# Patient Record
Sex: FEMALE | Race: OTHER | URBAN - METROPOLITAN AREA
[De-identification: names, ages, dates, MRNs, and addresses within clinical notes are randomized per-mention and may not be internally consistent; named-entity substitution may affect disease eponyms.]

---

## 2019-07-20 ENCOUNTER — IMPORTED ENCOUNTER (OUTPATIENT)
Dept: URBAN - METROPOLITAN AREA CLINIC 1 | Facility: CLINIC | Age: 74
End: 2019-07-20

## 2019-07-20 PROBLEM — H04.123: Noted: 2019-07-20

## 2019-07-20 PROBLEM — H35.3131: Noted: 2019-07-20

## 2019-07-20 PROBLEM — H25.813: Noted: 2019-07-20

## 2019-07-20 PROBLEM — H16.143: Noted: 2019-07-20

## 2019-07-20 PROCEDURE — 92015 DETERMINE REFRACTIVE STATE: CPT

## 2019-07-20 PROCEDURE — 92004 COMPRE OPH EXAM NEW PT 1/>: CPT

## 2019-07-20 NOTE — PATIENT DISCUSSION
1.  Cataract OU: Observe for now without intervention. The patient was advised to contact us if any change or worsening of vision2. ARMD OU Early/dry/stable. Importance of daily AREDS II study multivitamin and Amsler Grid checks discussed with patient. Patient to follow-up immediately with any new onset of decreased vision and/or metamorphopsia. 3. CANDY w/ PEK OU- Recommend ATs TID OU routinely 4. H/o Ocular Migraine  MRX for glasses given. Return for an appointment in 6 months 10/dfe with Dr. Malika Oropeza.

## 2020-01-30 ENCOUNTER — IMPORTED ENCOUNTER (OUTPATIENT)
Dept: URBAN - METROPOLITAN AREA CLINIC 1 | Facility: CLINIC | Age: 75
End: 2020-01-30

## 2020-01-30 PROBLEM — H35.3131: Noted: 2020-01-30

## 2020-01-30 PROBLEM — H25.813: Noted: 2020-01-30

## 2020-01-30 PROCEDURE — 92012 INTRM OPH EXAM EST PATIENT: CPT

## 2020-05-07 ENCOUNTER — IMPORTED ENCOUNTER (OUTPATIENT)
Dept: URBAN - METROPOLITAN AREA CLINIC 1 | Facility: CLINIC | Age: 75
End: 2020-05-07

## 2020-05-07 PROBLEM — H00.15: Noted: 2020-05-07

## 2020-05-07 PROBLEM — H00.031: Noted: 2020-05-07

## 2020-05-07 PROBLEM — H00.11: Noted: 2020-05-07

## 2020-05-07 PROCEDURE — 92012 INTRM OPH EXAM EST PATIENT: CPT

## 2020-05-07 NOTE — PATIENT DISCUSSION
1.  Chalazion Abscessed RUL-- Patient was compliant with hot compresses without resolution and is currently on Keflex. Discussed option for Incision and Drainage. Risks and Benefits discussed with patient. Patient stated complete understanding and would like to proceed with procedure. 2. Chalazion left lower eyelid -- Hot compresses TID x 5 minutes for 3 weeks. If without improvement discussed with patient possible Incision and Drainage procedure. Risks and benefits discussed with patient and patient states full understanding. 3. Cataract OU -- Observe for now without intervention. The patient was advised to contact us if any change or worsening of vision4. CANDY w/ PEK OU -- Cont PF ATs TID OU Routinely (Sample of PF Refresh [place] 3 given). Consider Plugs w/o improvement. 5.  H/o ARMD OU Early/dry/stable. Return for an appointment tomorrow for an I&D RUL with Dr. Nancy Gonzalez.

## 2020-05-07 NOTE — PATIENT DISCUSSION
Chalazion abscessed right upper eyelid - Hot compresses TID x 5 minutes for 3 weeks. If without improvement discussed with patient possible Incision and Drainage procedure. Risks and benefits discussed with patient and patient states full understanding.

## 2020-05-08 ENCOUNTER — IMPORTED ENCOUNTER (OUTPATIENT)
Dept: URBAN - METROPOLITAN AREA CLINIC 1 | Facility: CLINIC | Age: 75
End: 2020-05-08

## 2020-05-08 PROCEDURE — 67700 BLEPHAROTOMY DRG ABSC EYELID: CPT

## 2020-05-08 NOTE — PATIENT DISCUSSION
Incision and drainage of abscessed chalazion on right upper eyelid: After proper The patient was taken to the operating room and placed supine on the operating table. The right upper eye lid was prepped and draped in the standard surgical fashion. Xylocaine 1% and Epinephrine was infiltrated around the chalaziion. A chalazion speculum was then placed and the eyelid was everted. A cross incision was made through the chalazion and immediate release of the lipogranulomatous material was expressed. A curette was used to further evacuate the chalazion cavity. The speculum was removed Erythromycin ointment was applied and a pressure patch was placed. The patient tolerated the procedure well and there were no complications. Return for an appointment in August 30 with Dr. Dalila Miramontes.

## 2020-08-11 ENCOUNTER — IMPORTED ENCOUNTER (OUTPATIENT)
Dept: URBAN - METROPOLITAN AREA CLINIC 1 | Facility: CLINIC | Age: 75
End: 2020-08-11

## 2020-08-11 PROBLEM — H43.813: Noted: 2020-08-11

## 2020-08-11 PROBLEM — H25.813: Noted: 2020-08-11

## 2020-08-11 PROBLEM — H35.3132: Noted: 2020-08-11

## 2020-08-11 PROBLEM — H00.15: Noted: 2020-08-11

## 2020-08-11 PROCEDURE — 92014 COMPRE OPH EXAM EST PT 1/>: CPT

## 2020-08-11 NOTE — PATIENT DISCUSSION
1.  ARMD OU intermediate/dry/progression noted OU today. Importance of daily AREDS II study multivitamin and [place]ms[place] Grid checks discussed with patient. Patient to follow-up immediately with any new onset of decreased vision and/or metamorphopsia. 2. Residual Chalazion left lower eyelid -- Restart Hot compresses TID x 5 minutes. Recommend Therapearl mask. 3.  Cataract OU -- Observe for now without intervention. The patient was advised to contact us if any change or worsening of vision4. CANDY w/ PEK OU -- Cont PF ATs TID OU routinely. Plugs w/o improvement. 5.  PVD OU -- RD precautions. Patient defers MRx today. Return for an appointment in 6 months for a 10/dfe/glare with Dr. Karen Monreal.

## 2021-04-15 ENCOUNTER — IMPORTED ENCOUNTER (OUTPATIENT)
Dept: URBAN - METROPOLITAN AREA CLINIC 1 | Facility: CLINIC | Age: 76
End: 2021-04-15

## 2021-04-15 PROBLEM — H04.123: Noted: 2021-04-15

## 2021-04-15 PROBLEM — H35.3132: Noted: 2021-04-15

## 2021-04-15 PROBLEM — H25.813: Noted: 2021-04-15

## 2021-04-15 PROBLEM — H16.143: Noted: 2021-04-15

## 2021-04-15 PROCEDURE — 92012 INTRM OPH EXAM EST PATIENT: CPT

## 2021-04-15 NOTE — PATIENT DISCUSSION
1.  ARMD OU intermediate/dry/stable. Importance of daily AREDS II study multivitamin and Amsler Grid checks discussed with patient. Patient to follow-up immediately with any new onset of decreased vision and/or metamorphopsia. 2. Cataract OU -- Observe for now without intervention. The patient was advised to contact us if any change or worsening of vision3. CANDY w/ PEK OU -- Cont PF ATs TID OU routinely. Plugs w/o improvement. 4.  PVD OU -- RD precautions. Return for an appointment in 6 months for a 30/Mac photo/glare with Dr. Claudeen Cobble.

## 2021-11-08 ENCOUNTER — IMPORTED ENCOUNTER (OUTPATIENT)
Dept: URBAN - METROPOLITAN AREA CLINIC 1 | Facility: CLINIC | Age: 76
End: 2021-11-08

## 2021-11-08 PROBLEM — H25.813: Noted: 2021-11-08

## 2021-11-08 PROBLEM — H35.3132: Noted: 2021-11-08

## 2021-11-08 PROCEDURE — 92250 FUNDUS PHOTOGRAPHY W/I&R: CPT

## 2021-11-08 PROCEDURE — 92015 DETERMINE REFRACTIVE STATE: CPT

## 2021-11-08 PROCEDURE — 99214 OFFICE O/P EST MOD 30 MIN: CPT

## 2021-11-08 NOTE — PATIENT DISCUSSION
1.  ARMD OU Intermediate/dry/stable. Importance of daily AREDS II study multivitamin and Amsler Grid checks discussed with patient. Patient to follow-up immediately with any new onset of decreased vision and/or metamorphopsia. 2. Cataract OU:  Visually Significant secondary to glare discussed the risks benefits alternatives and limitations of cataract surgery. The patient stated a full understanding and a desire to proceed with the procedure. The patient will need to return for preop appointment with cataract measurements and to have any additional questions answered and start pre-operative eye drops as directed. *Myopic Goal approximately -2.50*Phaco PCL OS then OD Otherwise follow-up 6 months DFE/glare 3. CANDY w/ PEK OU - Recommend PF ATs TID OU routinely. Plugs w/o improvement. 4.  PVD OU - RD precautions. Return for an appointment for Mervin/JEREMIAS and PJami with Dr. Nancy Gonzalez.

## 2022-01-07 ENCOUNTER — IMPORTED ENCOUNTER (OUTPATIENT)
Dept: URBAN - METROPOLITAN AREA CLINIC 1 | Facility: CLINIC | Age: 77
End: 2022-01-07

## 2022-01-07 PROBLEM — H25.813: Noted: 2022-01-07

## 2022-01-07 PROCEDURE — 92136 OPHTHALMIC BIOMETRY: CPT

## 2022-01-07 NOTE — PATIENT DISCUSSION
1.  Cataract OU - Visually Significant secondary to glare discussed the risks benefits alternatives and limitations of cataract surgery. The patient stated a full understanding and a desire to proceed with the procedure. The patient will need to start pre-operative eye drops as directed. *Myopic Goal approximately -2.50*Phaco PCL OS then OD 2. H/o ARMD OU Intermediate/dry/stable. 3.  H/o CANDY w/ PEK OU 4.   H/o PVD OU

## 2022-01-12 ENCOUNTER — IMPORTED ENCOUNTER (OUTPATIENT)
Dept: URBAN - METROPOLITAN AREA CLINIC 1 | Facility: CLINIC | Age: 77
End: 2022-01-12

## 2022-01-12 PROBLEM — Z96.1: Noted: 2022-01-12

## 2022-01-13 ENCOUNTER — IMPORTED ENCOUNTER (OUTPATIENT)
Dept: URBAN - METROPOLITAN AREA CLINIC 1 | Facility: CLINIC | Age: 77
End: 2022-01-13

## 2022-01-13 PROBLEM — Z96.1: Noted: 2022-01-13

## 2022-01-13 PROCEDURE — 99024 POSTOP FOLLOW-UP VISIT: CPT

## 2022-01-13 NOTE — PATIENT DISCUSSION
POD#1 CE/IOL OS (Standard w/ LenSx) doing well. *Myopic Target*Use Prednisolone BID OS Prolensa Qdaily OS Ocuflox TID OS: Use all three gtts through completion of PO gtt chart regimen/ Per our instructions given to patient.   Post op Warnings Reiterated RTC as scheduled

## 2022-01-18 ENCOUNTER — IMPORTED ENCOUNTER (OUTPATIENT)
Dept: URBAN - METROPOLITAN AREA CLINIC 1 | Facility: CLINIC | Age: 77
End: 2022-01-18

## 2022-01-18 PROBLEM — H25.811: Noted: 2022-01-18

## 2022-01-18 PROCEDURE — 92136 OPHTHALMIC BIOMETRY: CPT

## 2022-01-18 NOTE — PATIENT DISCUSSION
1.  Cataract OD - Visually Significant secondary to glare discussed the risks benefits alternatives and limitations of cataract surgery. The patient stated a full understanding and a desire to proceed with the procedure. Discussed with patient if PO Gtts are more than $120 for all three combined when filling at their Pharmacy please call our office to request generic substitutions. Phaco PCL OD 2. POW#3  CE/IOL OS doing well. Use Prednisolone BID OS & Prolensa Qdaily OS: Use gtts through completion of PO gtt chart regimen.  F/u as scheduled 2nd eye

## 2022-01-26 ENCOUNTER — IMPORTED ENCOUNTER (OUTPATIENT)
Dept: URBAN - METROPOLITAN AREA CLINIC 1 | Facility: CLINIC | Age: 77
End: 2022-01-26

## 2022-01-26 PROBLEM — Z96.1: Noted: 2022-01-26

## 2022-01-26 PROBLEM — Z96.1: Noted: 2022-01-12

## 2022-01-27 ENCOUNTER — IMPORTED ENCOUNTER (OUTPATIENT)
Dept: URBAN - METROPOLITAN AREA CLINIC 1 | Facility: CLINIC | Age: 77
End: 2022-01-27

## 2022-01-27 PROCEDURE — 99024 POSTOP FOLLOW-UP VISIT: CPT

## 2022-01-27 NOTE — PATIENT DISCUSSION
1. POD#1 Phaco/ PCL OD (Standard w/ LenSx) -- doing well. *Myopic TargetUse Prednisolone BID OD Prolensa Qdaily OD Ocuflox TID OD: Use all three gtts through completion of PO gtt chart regimen/ Per our instructions given. Post op Warnings Reiterated 2.  POW#3 Phaco/ PCL OS -- doing well Use Prednisolone BID OS & Prolensa Qdaily OSRTC as scheduled

## 2022-01-31 PROBLEM — Z96.1: Noted: 2022-01-31

## 2022-02-17 ENCOUNTER — POST-OP (OUTPATIENT)
Dept: URBAN - METROPOLITAN AREA CLINIC 1 | Facility: CLINIC | Age: 77
End: 2022-02-17

## 2022-02-17 DIAGNOSIS — Z96.1: ICD-10-CM

## 2022-02-17 PROCEDURE — 99024 POSTOP FOLLOW-UP VISIT: CPT

## 2022-02-17 ASSESSMENT — TONOMETRY
OS_IOP_MMHG: 15
OD_IOP_MMHG: 15

## 2022-02-17 ASSESSMENT — VISUAL ACUITY
OD_SC: 20/400
OD_PH: 20/50
OS_PH: 20/50
OS_SC: 20/400

## 2022-04-02 ASSESSMENT — VISUAL ACUITY
OS_GLARE: 20/80
OD_GLARE: 20/80
OD_CC: 20/400
OS_GLARE: 20/60
OD_SC: 20/20-1
OS_SC: J1+
OD_SC: J1+
OS_SC: 20/50-2
OS_SC: 20/30
OD_GLARE: 20/80
OS_GLARE: 20/80
OS_SC: 20/30-1
OS_SC: 20/40
OS_CC: 20/150
OD_PH: SC 20/30
OS_PH: SC 20/40
OS_SC: 20/30
OS_CC: 20/150
OD_SC: 20/30
OD_GLARE: 20/80
OS_SC: 20/30
OD_SC: 20/40-1
OD_GLARE: 20/80
OD_GLARE: 20/60
OS_PH: SC 20/40 +2
OD_SC: 20/20-2
OS_GLARE: 20/80
OS_GLARE: 20/80
OS_CC: 20/400
OD_SC: 20/25
OD_SC: 20/30

## 2022-04-02 ASSESSMENT — TONOMETRY
OD_IOP_MMHG: 16
OS_IOP_MMHG: 12
OD_IOP_MMHG: 13
OS_IOP_MMHG: 13
OS_IOP_MMHG: 14
OS_IOP_MMHG: 16
OS_IOP_MMHG: 14
OS_IOP_MMHG: 14
OD_IOP_MMHG: 14
OD_IOP_MMHG: 12
OS_IOP_MMHG: 15
OD_IOP_MMHG: 13
OS_IOP_MMHG: 15
OD_IOP_MMHG: 13
OS_IOP_MMHG: 12
OD_IOP_MMHG: 16
OD_IOP_MMHG: 15

## 2022-04-02 ASSESSMENT — KERATOMETRY
OS_K1POWER_DIOPTERS: 44.00
OD_AXISANGLE2_DEGREES: 093
OD_AXISANGLE_DEGREES: 003
OS_K2POWER_DIOPTERS: 45.25
OD_K2POWER_DIOPTERS: 45.25
OS_AXISANGLE_DEGREES: 173
OS_AXISANGLE2_DEGREES: 083
OD_K1POWER_DIOPTERS: 44.25

## 2023-01-13 ENCOUNTER — COMPREHENSIVE EXAM (OUTPATIENT)
Dept: URBAN - METROPOLITAN AREA CLINIC 1 | Facility: CLINIC | Age: 78
End: 2023-01-13

## 2023-01-13 DIAGNOSIS — Z96.1: ICD-10-CM

## 2023-01-13 DIAGNOSIS — H43.813: ICD-10-CM

## 2023-01-13 DIAGNOSIS — H35.3132: ICD-10-CM

## 2023-01-13 DIAGNOSIS — H04.123: ICD-10-CM

## 2023-01-13 PROCEDURE — 99214 OFFICE O/P EST MOD 30 MIN: CPT

## 2023-01-13 ASSESSMENT — TONOMETRY
OS_IOP_MMHG: 13
OD_IOP_MMHG: 13

## 2023-01-13 ASSESSMENT — VISUAL ACUITY
OS_CC: 20/20
OD_CC: 20/20

## 2023-11-07 ENCOUNTER — FOLLOW UP (OUTPATIENT)
Dept: URBAN - METROPOLITAN AREA CLINIC 1 | Facility: CLINIC | Age: 78
End: 2023-11-07

## 2023-11-07 DIAGNOSIS — H35.3132: ICD-10-CM

## 2023-11-07 PROCEDURE — 99213 OFFICE O/P EST LOW 20 MIN: CPT

## 2023-11-07 PROCEDURE — 92134 CPTRZ OPH DX IMG PST SGM RTA: CPT

## 2023-11-07 ASSESSMENT — TONOMETRY
OS_IOP_MMHG: 13
OD_IOP_MMHG: 13

## 2023-11-07 ASSESSMENT — VISUAL ACUITY
OD_BAT: 20/60
OS_BAT: 20/60
OS_CC: 20/25
OD_CC: 20/25

## 2024-04-30 NOTE — PATIENT DISCUSSION
1.  ARMD OU Early/dry/stable. Importance of daily AREDS II study multivitamin and Amsler Grid checks discussed with patient. Patient to follow-up immediately with any new onset of decreased vision and/or metamorphopsia. 2. Cataract OU: Observe for now without intervention. The patient was advised to contact us if any change or worsening of vision3. CANDY w/ PEK OU- Switch to PF ATs and use TID OU Routinely (Sample of PF Refresh [place] 3 given) Consider Plugs w/o improvement. Return for an appointment in 6 mo 30 glare with Dr. Agus Harris.
Cataract OU: Observe for now without intervention.  The patient was advised to contact us if any change or worsening of vision
Right knee pain